# Patient Record
Sex: MALE | Race: WHITE
[De-identification: names, ages, dates, MRNs, and addresses within clinical notes are randomized per-mention and may not be internally consistent; named-entity substitution may affect disease eponyms.]

---

## 2018-06-18 ENCOUNTER — HOSPITAL ENCOUNTER (EMERGENCY)
Dept: HOSPITAL 47 - EC | Age: 38
Discharge: HOME | End: 2018-06-18
Payer: COMMERCIAL

## 2018-06-18 VITALS
DIASTOLIC BLOOD PRESSURE: 78 MMHG | TEMPERATURE: 98.1 F | RESPIRATION RATE: 20 BRPM | SYSTOLIC BLOOD PRESSURE: 126 MMHG | HEART RATE: 60 BPM

## 2018-06-18 DIAGNOSIS — F17.200: ICD-10-CM

## 2018-06-18 DIAGNOSIS — M25.511: ICD-10-CM

## 2018-06-18 DIAGNOSIS — M50.122: Primary | ICD-10-CM

## 2018-06-18 PROCEDURE — 72050 X-RAY EXAM NECK SPINE 4/5VWS: CPT

## 2018-06-18 PROCEDURE — 99283 EMERGENCY DEPT VISIT LOW MDM: CPT

## 2018-06-18 NOTE — XR
EXAMINATION TYPE: XR cervical spine comp

 

DATE OF EXAM: 6/18/2018

 

COMPARISON: NONE

 

HISTORY: Pain

 

TECHNIQUE: Four views are submitted.

 

FINDINGS: 

The odontoid is intact.  There are no compression deformities.  The prevertebral soft tissue structur
es are within normal limits.  Degenerative disc disease C5-C6. I suggest cervical ribs bilaterally

 

IMPRESSION:

1. Degenerative disc disease C5-C6. Correlate with MRI as clinically warranted.

## 2018-06-18 NOTE — ED
General Adult HPI





- General


Chief complaint: Extremity Problem,Nontraumatic


Stated complaint: Shoulder Pain


Time Seen by Provider: 06/18/18 13:36


Source: patient, RN notes reviewed


Mode of arrival: ambulatory


Limitations: no limitations





- History of Present Illness


Initial comments: 





37-year-old male presents to the emergency department for a chief complaint of 

right shoulder pain 8 days.  Patient denies any injury.  Patient states he has 

a shooting pain from his neck down into his right shoulder and right upper arm.

  Patient states sometimes his hand feels tingly.  Patient also has neck pain.  

Patient has tried Motrin for this which helps somewhat. Patient has no other 

complaints at this time including shortness of breath, chest pain, abdominal 

pain, nausea or vomiting, headache, or visual changes.





- Related Data


 Previous Rx's











 Medication  Instructions  Recorded


 


Cyclobenzaprine [Flexeril] 5 mg PO TID #12 tablet 06/18/18


 


Ibuprofen [Motrin] 600 mg PO Q8HR PRN #20 tab 06/18/18











 Allergies











Allergy/AdvReac Type Severity Reaction Status Date / Time


 


No Known Allergies Allergy   Verified 06/18/18 13:43














Review of Systems


ROS Statement: 


Those systems with pertinent positive or pertinent negative responses have been 

documented in the HPI.





ROS Other: All systems not noted in ROS Statement are negative.





Past Medical History


Past Medical History: No Reported History


History of Any Multi-Drug Resistant Organisms: None Reported


Past Surgical History: No Surgical Hx Reported


Past Psychological History: No Psychological Hx Reported


Smoking Status: Current every day smoker


Past Alcohol Use History: Occasional


Past Drug Use History: None Reported





General Exam


Limitations: no limitations


General appearance: alert, in no apparent distress


Head exam: Present: atraumatic, normocephalic, normal inspection


Eye exam: Present: normal appearance, PERRL, EOMI.  Absent: scleral icterus, 

conjunctival injection, periorbital swelling


ENT exam: Present: normal exam, mucous membranes moist


Neck exam: Present: tenderness (Right-sided paraspinal tenderness).  Absent: 

meningismus, full ROM (Patient has full range of motion in rotation to the left 

about 45 of range of motion on rotation to the right.  Patient has full 

flexion and extension of the neck.), lymphadenopathy, thyromegaly


Respiratory exam: Present: normal lung sounds bilaterally.  Absent: respiratory 

distress, wheezes, rales, rhonchi, stridor


Cardiovascular Exam: Present: regular rate, normal rhythm, normal heart sounds.

  Absent: systolic murmur, diastolic murmur, rubs, gallop, clicks


Extremities exam: Present: tenderness (Diffuse enderness to the shoulder joint.)

, normal capillary refill (Refill less than 2 seconds and radial pulse 2+), 

other (Strength 5 out of 5 in upper extremities bilaterally.   strength 5 

out of 5 in hands bilaterally.  Sensation intact in right upper extremity.).  

Absent: full ROM (Patient has about 90 of flexion and abduction of the right 

shoulder.  Full extension.), joint swelling (No swelling noted in the right 

upper extremity)


Back exam: Present: normal inspection, full ROM.  Absent: tenderness





Course


 Vital Signs











  06/18/18





  13:18


 


Temperature 98.1 F


 


Pulse Rate 60


 


Respiratory 20





Rate 


 


Blood Pressure 126/78


 


O2 Sat by Pulse 98





Oximetry 














Medical Decision Making





- Medical Decision Making





37-year-old male presents to the emergency determine for chief complaint of 

right shoulder pain 8 days.   Patient has sharp shooting pain from the neck to 

the right shoulder.  Patient also has a sharp shooting pain in the right arm.  

Sates his hand is somewhat tingly.  No acute injuries.  On exam patient has 

limited range of motion of the right shoulder.  Patient also has limited 

rotation of the neck to the right side.  Patient has tenderness of the right 

paraspinal neck muscles and trapezius between the spine and shoulder blade.  

Strength 5 out of 5 in upper extremities bilaterally.   strength 5 out of 5 

in upper extremities bilaterally.  Neurovascular intact in right upper 

extremity.  X-ray c-spine demonstrates degenerative disc disease C5 to C6.  X-

ray of the right shoulder shows no acute fracture or dislocation.  Mild 

narrowing of before meals joint.  No acute process.  Patient likely has a 

cervical radiculopathy.  He will follow up with primary care in 1-2 days.  He 

will take Motrin and Tylenol for pain as well as a muscle relaxer.  He was 

educated not to drive or operate machinery with a muscle relaxer.  He will 

return to the emergency department if he has any worsening symptoms.





Disposition


Clinical Impression: 


 Cervical radiculopathy, Shoulder pain, right





Disposition: HOME SELF-CARE


Condition: Good


Instructions:  Cervical Radiculopathy (ED), Shoulder Pain (ED)


Additional Instructions: 


Please take Motrin as needed.  Please take Flexeril as needed.  Do not drive or 

operate machinery when taking Flexeril.  Follow up with primary care in 1-2 

days.  Return to the emergency department if you have any worsening symptoms.


Prescriptions: 


Cyclobenzaprine [Flexeril] 5 mg PO TID #12 tablet


Ibuprofen [Motrin] 600 mg PO Q8HR PRN #20 tab


 PRN Reason: Pain


Is patient prescribed a controlled substance at d/c from ED?: No


Referrals: 


Gurwinder French MD [STAFF PHYSICIAN] - 1-2 days


Time of Disposition: 14:41

## 2018-06-18 NOTE — XR
EXAMINATION TYPE: XR shoulder complete RT

 

DATE OF EXAM: 6/18/2018

 

COMPARISON: NONE

 

HISTORY: Pain

 

TECHNIQUE: Three views are submitted.

 

FINDINGS:

The osseous structures are intact.  There is no acute fracture or dislocation. Mild narrowing of the 
AC joint..  

 

IMPRESSION:

1. No acute process.

## 2021-02-03 ENCOUNTER — HOSPITAL ENCOUNTER (OUTPATIENT)
Dept: HOSPITAL 47 - RADUSWWP | Age: 41
Discharge: HOME | End: 2021-02-03
Attending: FAMILY MEDICINE
Payer: COMMERCIAL

## 2021-02-03 DIAGNOSIS — N50.3: Primary | ICD-10-CM

## 2021-02-03 DIAGNOSIS — N50.89: ICD-10-CM

## 2021-02-03 PROCEDURE — 76870 US EXAM SCROTUM: CPT

## 2021-02-03 PROCEDURE — 93975 VASCULAR STUDY: CPT

## 2021-02-03 NOTE — US
EXAMINATION TYPE: US scrotum with doppler.  Grayscale and color Doppler Duplex imaging performed of ramonita chapman scrotum.

 

DATE OF EXAM: 2/3/2021

 

COMPARISON: NONE

 

CLINICAL HISTORY: N50.819 TESTICULAR PAIN. Pain bilateral testes 

 

 

EXAM MEASUREMENTS:

 

TESTICLES:

Right Testicle:  4.7 x 2.3 x 3.2 cm

Left Testicle:  4.2 x 1.8 x 3.4 cm

 

EPIDIDYMIS HEAD:

Right Epididymis:  0.9 cm

Left Epididymis:  0.9 cm  

 

Doppler performed to assess for testicular vascularity; good bilateral color flow and waveforms are s
een.   There is no evidence of testicular torsion.

 

Presence of hydroceles:  no significant fluid collection noted 

Presence of varicoceles:  prominent/increased  vascularity lateral to right testicle and medial to le
ft testicle 

 

**cystic areas right epididymis = 0.3cm and 0.3cm 

 

 

 

IMPRESSION: 

1. Increased vascularity within the midline could be related to varicoceles.

2. Small right epididymal cysts.

3. Bilateral testicles appear within normal limits.

## 2021-02-16 ENCOUNTER — HOSPITAL ENCOUNTER (OUTPATIENT)
Dept: HOSPITAL 47 - ORWHC2ENDO | Age: 41
Discharge: HOME | End: 2021-02-16
Attending: INTERNAL MEDICINE
Payer: COMMERCIAL

## 2021-02-16 VITALS — TEMPERATURE: 98.3 F | RESPIRATION RATE: 16 BRPM

## 2021-02-16 VITALS — BODY MASS INDEX: 21.4 KG/M2

## 2021-02-16 VITALS — SYSTOLIC BLOOD PRESSURE: 160 MMHG | HEART RATE: 54 BPM | DIASTOLIC BLOOD PRESSURE: 84 MMHG

## 2021-02-16 DIAGNOSIS — Z98.890: ICD-10-CM

## 2021-02-16 DIAGNOSIS — F17.210: ICD-10-CM

## 2021-02-16 DIAGNOSIS — D12.0: Primary | ICD-10-CM

## 2021-02-16 DIAGNOSIS — K64.8: ICD-10-CM

## 2021-02-16 PROCEDURE — 45380 COLONOSCOPY AND BIOPSY: CPT

## 2021-02-16 PROCEDURE — 88305 TISSUE EXAM BY PATHOLOGIST: CPT

## 2021-02-16 RX ADMIN — POTASSIUM CHLORIDE SCH MLS: 14.9 INJECTION, SOLUTION INTRAVENOUS at 08:16

## 2021-02-16 RX ADMIN — POTASSIUM CHLORIDE SCH MLS: 14.9 INJECTION, SOLUTION INTRAVENOUS at 07:40

## 2021-02-16 NOTE — P.PCN
Date of Procedure: 02/16/21


Description of Procedure: 


BRIEF HISTORY: 


Patient is a 40-year-old male presenting for outpatient colonoscopy for 

evaluation of hemorrhage of the anus and rectum.  Patient had been seen in the 

clinic reporting red tinged blood and change in the caliber of his stool.  No 

prior colonoscopy.  No family history of colon cancer or inflammatory bowel 

disease.





PROCEDURE PERFORMED: 


Colonoscopy with polypectomy. 





PREOPERATIVE DIAGNOSIS: 


Hemorrhage of the anus and rectum, no prior colonoscopy. 





ESTIMATED BLOOD LOSS: 


Minimal.





IV sedation per Anesthesia. 





PROCEDURE: 


After informed consent was obtained, the patient, was brought into the endoscopy

unit. IV sedation was administered by Anesthesia under continuous monitoring.  

Digital rectal examination was normal. Initially the Olympus CF-190 flexible 

video colonoscope was then inserted in the rectum, gradually advanced into the 

cecum without any difficulty. Careful examination was performed as the scope was

gradually being withdrawn. Ileocecal valve and the appendiceal orifice were 

visualized and appeared normal.  Prep was excellent. Mucosa of the cecum, 

ascending colon, transverse colon, descending colon, sigmoid colon, and rectum 

appeared normal.  2 diminutive cecal polyps measuring 1 mm in size removed with 

cold forcep polypectomy.  Retroflexion was performed in the rectum and no 

lesions were seen, low-grade internal hemorrhoids. The patient tolerated the 

procedure well. 





IMPRESSION: 


2 diminutive cecal polyps removed with cold forcep polypectomy.


Internal hemorrhoids.





RECOMMENDATIONS:  


Findings of this examination were discussed with the patient and his family.  

Okay to resume diet.  Okay to resume medications.   Extensive discussion with 

the patient regarding local hemorrhoidal care.  Repeat colonoscopy in 7 years 

for colon polyps, if polyps are hyperplastic okay to repeat in 10 years.